# Patient Record
Sex: MALE
[De-identification: names, ages, dates, MRNs, and addresses within clinical notes are randomized per-mention and may not be internally consistent; named-entity substitution may affect disease eponyms.]

---

## 2018-05-05 ENCOUNTER — HOSPITAL ENCOUNTER (EMERGENCY)
Dept: HOSPITAL 14 - H.ER | Age: 28
Discharge: HOME | End: 2018-05-05
Payer: COMMERCIAL

## 2018-05-05 VITALS
OXYGEN SATURATION: 100 % | SYSTOLIC BLOOD PRESSURE: 158 MMHG | DIASTOLIC BLOOD PRESSURE: 90 MMHG | HEART RATE: 84 BPM | RESPIRATION RATE: 20 BRPM | TEMPERATURE: 98.9 F

## 2018-05-05 DIAGNOSIS — F41.9: Primary | ICD-10-CM

## 2018-05-05 NOTE — ED PDOC
HPI: Psych/Substance Abuse


Time Seen by Provider: 05/05/18 16:45


Chief Complaint (Nursing): Psychiatric Evaluation


Chief Complaint (Provider): Psychiatric Evaluation


History Per: Patient


History/Exam Limitations: no limitations


Onset/Duration Of Symptoms: Mins (prior to arrival)


Current Symptoms Are (Timing): Still Present


Additional Complaint(s): 


27 year old male presents to the emergency room via EMS for a psych evaluation 

after he reported feelings of anxiousness, and hearing voices in his head to 

which he cannot tell if they are his own or not. He denies suicidal and 

homicidal ideation





PMD: none provided





Past Medical History


Reviewed: Historical Data, Nursing Documentation, Vital Signs


Vital Signs: 





 Last Vital Signs











Temp  98.9 F   05/05/18 16:44


 


Pulse  84   05/05/18 16:44


 


Resp  20   05/05/18 16:44


 


BP  158/90 H  05/05/18 16:44


 


Pulse Ox  100   05/05/18 16:44














- Family History


Family History: States: Unknown Family Hx





- Immunization History


Hx Tetanus Toxoid Vaccination: No


Hx Influenza Vaccination: No


Hx Pneumococcal Vaccination: No





- Home Medications


Home Medications: 


 Ambulatory Orders











 Medication  Instructions  Recorded


 


Amoxicillin/Clavulanate [Augmentin 1 tab PO BID #20 tab 02/21/16





875 MG-125 MG]  


 


Naproxen [Naprosyn] 1 tab PO BID PRN #25 tab 02/21/16














- Allergies


Allergies/Adverse Reactions: 


 Allergies











Allergy/AdvReac Type Severity Reaction Status Date / Time


 


No Known Allergies Allergy   Verified 05/05/18 16:44














Review of Systems


ROS Statement: Except As Marked, All Systems Reviewed And Found Negative


Psych: Positive for: Anxiety.  Negative for: Suicidal ideation (or homicidal)





Physical Exam





- Reviewed


Nursing Documentation Reviewed: Yes


Vital Signs Reviewed: Yes





- Physical Exam


Appears: Positive for: No Acute Distress


Head Exam: Positive for: ATRAUMATIC, NORMAL INSPECTION, NORMOCEPHALIC


Skin: Positive for: Normal Color, Warm, DRY


Eye Exam: Positive for: EOMI, Normal appearance, PERRL


ENT: Positive for: Normal ENT Inspection


Neck: Positive for: Normal


Cardiovascular/Chest: Positive for: Regular Rate, Rhythm.  Negative for: Murmur


Respiratory: Positive for: Normal Breath Sounds.  Negative for: Accessory 

Muscle Use, Respiratory Distress


Back: Positive for: Normal Inspection


Extremity: Positive for: Normal ROM.  Negative for: Deformity


Neurologic/Psych: Positive for: Alert, Oriented





- ECG


O2 Sat by Pulse Oximetry: 100 (RA)


Pulse Ox Interpretation: Normal





Medical Decision Making


Medical Decision Making: 


Time: 17:23


Initial Plan:


--Crisis Evaluation





Time: 18:30


Crisis completed evaluation and cleared patient for discharge.





--------------------------------------------------------------------------------

----------------------------------


Scribe Attestation:


Documented by Genoveva Chicas, acting as a scribe for Anahi Mendoza PA-C





Provider Scribe Attestation:


All medical entries made by the Scribe were at my direction and personally 

dictated by me. I have reviewed the chart and agree that the record accurately 

reflects my personal performance of the history, physical exam, medical 

decision making, and the department course for this patient. I have also 

personally directed, reviewed, and agree with the discharge instructions and 

disposition. 





Disposition





- Clinical Impression


Clinical Impression: 


 Anxiety








- Disposition


Disposition: Routine/Home


Disposition Time: 18:56


Condition: STABLE


Instructions:  Anxiety, Adult (DC)


Forms:  [employer] (English)


Print Language: Tajik